# Patient Record
Sex: FEMALE | Race: WHITE | Employment: FULL TIME | URBAN - METROPOLITAN AREA
[De-identification: names, ages, dates, MRNs, and addresses within clinical notes are randomized per-mention and may not be internally consistent; named-entity substitution may affect disease eponyms.]

---

## 2017-02-14 ENCOUNTER — OFFICE VISIT (OUTPATIENT)
Dept: URGENT CARE | Facility: URGENT CARE | Age: 27
End: 2017-02-14
Payer: COMMERCIAL

## 2017-02-14 VITALS
TEMPERATURE: 98 F | BODY MASS INDEX: 23.98 KG/M2 | SYSTOLIC BLOOD PRESSURE: 110 MMHG | HEART RATE: 66 BPM | WEIGHT: 155.4 LBS | OXYGEN SATURATION: 98 % | DIASTOLIC BLOOD PRESSURE: 67 MMHG

## 2017-02-14 DIAGNOSIS — T19.2XXA FOREIGN BODY IN VAGINA, INITIAL ENCOUNTER: Primary | ICD-10-CM

## 2017-02-14 PROCEDURE — 99213 OFFICE O/P EST LOW 20 MIN: CPT | Performed by: NURSE PRACTITIONER

## 2017-02-14 NOTE — MR AVS SNAPSHOT
After Visit Summary   2/14/2017    Jeri Sewell    MRN: [id number]           Patient Information     Date Of Birth          1990        Visit Information        Provider Department      2/14/2017 8:25 PM Nydia Dubois NP [facility]        Today's Diagnoses     Foreign body in vagina, initial encounter    -  1       Follow-ups after your visit        Follow-up notes from your care team     Return if symptoms worsen or fail to improve.      Who to contact     If you have questions or need follow up information about today's clinic visit or your schedule please contact [facility] directly at [phone].  Normal or non-critical lab and imaging results will be communicated to you by MyChart, letter or phone within 4 business days after the clinic has received the results. If you do not hear from us within 7 days, please contact the clinic through Ne[employer] Kooz[employer] or phone. If you have a critical or abnormal lab result, we will notify you by phone as soon as possible.  Submit refill requests through [employer] or call your pharmacy and they will forward the refill request to us. Please allow 3 business days for your refill to be completed.          Additional Information About Your Visit        MyChart Information     [employer] gives you secure access to your electronic health record. If you see a primary care provider, you can also send messages to your care team and make appointments. If you have questions, please call your primary care clinic.  If you do not have a primary care provider, please call [phone] and they will assist you.        Care EveryWhere ID     This is your Care EveryWhere ID. This could be used by other organizations to access your [facility] medical records  [id number]        Your Vitals Were     Pulse Temperature Pulse Oximetry BMI (Body Mass Index)          66 98  F (36.7  C) (Oral) 98% 23.98 kg/m2         Blood Pressure from Last 3  Encounters:   02/14/17 110/67   01/29/16 124/74   12/28/15 125/82    Weight from Last 3 Encounters:   02/14/17 155 lb 6.4 oz (70.5 kg)   01/29/16 156 lb 3.2 oz (70.9 kg)   12/28/15 150 lb 3.2 oz (68.1 kg)              Today, you had the following     No orders found for display       Primary Care Provider Office Phone # Fax #    Sonja THOMAS [facility] [phone]       [facility] [address] [facility] [address]        Thank you!     Thank you for choosing [facility]  for your care. Our goal is always to provide you with excellent care. Hearing back from our patients is one way we can continue to improve our services. Please take a few minutes to complete the written survey that you may receive in the mail after your visit with us. Thank you!             Your Updated Medication List - Protect others around you: Learn how to safely use, store and throw away your medicines at www.disposemymeds.org.          This list is accurate as of: 2/14/17  9:06 PM.  Always use your most recent med list.                   Brand Name Dispense Instructions for use    clotrimazole-betamethasone cream    LOTRISONE    45 g    Apply topically 2 times daily       valACYclovir 1000 mg tablet    VALTREX    90 tablet    Take 1 tablet (1,000 mg) by mouth daily

## 2017-02-15 NOTE — PROGRESS NOTES
SUBJECTIVE:                                                    Jeri Sewell is a 27 year old female who presents to clinic today for the following health issues:      Possible tampon stuck in vagina  Attempted to get tampon out but believes turned to side          Problem list and histories reviewed & adjusted, as indicated.  Additional history: as documented    Patient Active Problem List   Diagnosis     Genital herpes     CARDIOVASCULAR SCREENING; LDL GOAL LESS THAN 160     Multiple pigmented nevi     ASCUS favor benign     Other nonspecific finding on examination of urine     Hashimoto's disease     Past Surgical History   Procedure Laterality Date     Hc tooth extraction w/forcep       Colonoscopy with co2 insufflation N/A 12/17/2014     Procedure: COLONOSCOPY WITH CO2 INSUFFLATION;  Surgeon: Duane, William Charles, MD;  Location:  OR       Social History   Substance Use Topics     Smoking status: Never Smoker     Smokeless tobacco: Never Used      Comment: nonsmoking household     Alcohol use Yes      Comment: occassionally     Family History   Problem Relation Age of Onset     HEART DISEASE Maternal Grandfather          Current Outpatient Prescriptions   Medication Sig Dispense Refill     valACYclovir (VALTREX) 1000 mg tablet Take 1 tablet (1,000 mg) by mouth daily 90 tablet 0     clotrimazole-betamethasone (LOTRISONE) cream Apply topically 2 times daily (Patient not taking: Reported on 2/14/2017) 45 g 0     No Known Allergies    ROS:  C: NEGATIVE for fever, chills, change in weight  E/M: NEGATIVE for ear, mouth and throat problems  R: NEGATIVE for significant cough or SOB  CV: NEGATIVE for chest pain, palpitations or peripheral edema  : positive for foreign body    OBJECTIVE:                                                    /67 (BP Location: Left arm, Patient Position: Chair, Cuff Size: Adult Regular)  Pulse 66  Temp 98  F (36.7  C) (Oral)  Wt 155 lb 6.4 oz (70.5 kg)  SpO2 98%  BMI 23.98  kg/m2  Body mass index is 23.98 kg/(m^2).  GENERAL: healthy, alert and no distress  NECK: no adenopathy, no asymmetry, masses, or scars and thyroid normal to palpation  RESP: lungs clear to auscultation - no rales, rhonchi or wheezes  CV: regular rate and rhythm, normal S1 S2, no S3 or S4, no murmur, click or rub, no peripheral edema and peripheral pulses strong  ABDOMEN: soft, nontender, no hepatosplenomegaly, no masses and bowel sounds normal   (female): normal female external genitalia, normal urethral meatus, vaginal mucosa, no discharge,  palpable tampon inside vagina  MS: no gross musculoskeletal defects noted, no edema    Diagnostic Test Results:  none      ASSESSMENT/PLAN:                                                        ICD-10-CM    1. Foreign body in vagina, initial encounter T19.2XXA      A tampon removed manually from the vagina. Patient tolerated procedure.  Nydia Dubois NP  [facility]

## 2017-02-15 NOTE — NURSING NOTE
"Chief Complaint   Patient presents with     Foreign Body in Vagina     tampon       Initial /67 (BP Location: Left arm, Patient Position: Chair, Cuff Size: Adult Regular)  Pulse 66  Temp 98  F (36.7  C) (Oral)  Wt 155 lb 6.4 oz (70.5 kg)  SpO2 98%  BMI 23.98 kg/m2 Estimated body mass index is 23.98 kg/(m^2) as calculated from the following:    Height as of 1/29/16: 5' 7.5\" (1.715 m).    Weight as of this encounter: 155 lb 6.4 oz (70.5 kg).  Medication Reconciliation: complete     Christina Dong MA    "

## 2017-03-06 DIAGNOSIS — B00.9 HERPES SIMPLEX VIRUS INFECTION: Primary | ICD-10-CM

## 2017-03-06 NOTE — LETTER
[facility]  [address] [address] [phone]      March 7, 2017      Jeri Sewell  [address] [facility] [address]              Dear Jeri,    Regarding a recent refill request we received- one refill was sent to the pharmacy.  You are overdue to be seen in clinic for your annual exam.  Please contact our office to schedule this appointment before your next refill is due.      Sincerely,      Sonja Vail CNP

## 2017-03-07 RX ORDER — VALACYCLOVIR HYDROCHLORIDE 1 G/1
1000 TABLET, FILM COATED ORAL DAILY
Qty: 30 TABLET | Refills: 0 | Status: SHIPPED | OUTPATIENT
Start: 2017-03-07 | End: 2017-04-03

## 2017-03-07 NOTE — TELEPHONE ENCOUNTER
Does not look like patient was informed she needed to be seen for additional refills. Will send 30 day extension. Please notify patient she needs to be seen for further refills. Thank you. Sonja GUZMAN CNP

## 2017-03-07 NOTE — TELEPHONE ENCOUNTER
Last AFE with Sonja Vail on 8/21/15- patient overdue for AFE.  Last prescribed by Sonja on 11/16/16 for 3 months.  Will forward to provider to advise.

## 2017-04-03 ENCOUNTER — OFFICE VISIT (OUTPATIENT)
Dept: OBGYN | Facility: CLINIC | Age: 27
End: 2017-04-03
Payer: COMMERCIAL

## 2017-04-03 VITALS
HEART RATE: 118 BPM | DIASTOLIC BLOOD PRESSURE: 84 MMHG | TEMPERATURE: 99 F | BODY MASS INDEX: 23.4 KG/M2 | HEIGHT: 68 IN | SYSTOLIC BLOOD PRESSURE: 129 MMHG | WEIGHT: 154.4 LBS

## 2017-04-03 DIAGNOSIS — Z01.419 ENCOUNTER FOR GYNECOLOGICAL EXAMINATION WITHOUT ABNORMAL FINDING: Primary | ICD-10-CM

## 2017-04-03 DIAGNOSIS — Z13.6 CARDIOVASCULAR SCREENING; LDL GOAL LESS THAN 160: ICD-10-CM

## 2017-04-03 DIAGNOSIS — B00.9 HERPES SIMPLEX VIRUS INFECTION: ICD-10-CM

## 2017-04-03 DIAGNOSIS — E06.3 HASHIMOTO'S DISEASE: ICD-10-CM

## 2017-04-03 DIAGNOSIS — Z87.42 HISTORY OF ABNORMAL CERVICAL PAP SMEAR: ICD-10-CM

## 2017-04-03 PROCEDURE — 88175 CYTOPATH C/V AUTO FLUID REDO: CPT | Performed by: NURSE PRACTITIONER

## 2017-04-03 PROCEDURE — 87624 HPV HI-RISK TYP POOLED RSLT: CPT | Performed by: NURSE PRACTITIONER

## 2017-04-03 PROCEDURE — 99395 PREV VISIT EST AGE 18-39: CPT | Performed by: NURSE PRACTITIONER

## 2017-04-03 RX ORDER — VALACYCLOVIR HYDROCHLORIDE 1 G/1
1000 TABLET, FILM COATED ORAL DAILY
Qty: 90 TABLET | Refills: 3 | Status: SHIPPED | OUTPATIENT
Start: 2017-04-03 | End: 2018-04-25

## 2017-04-03 ASSESSMENT — PAIN SCALES - GENERAL: PAINLEVEL: NO PAIN (0)

## 2017-04-03 NOTE — PROGRESS NOTES
S: Pt is a 27 year old  0 para 0 who presents today for an annual female exam. LMP: 3/6/17. Contraception: condom.   Declines STD screening. Last Pap smear:  and was ASCUS. Immunizations up to date. Dental Exams: regular. Diet and calcium reviewed. Exercise: regular.    Needs refill on her Valtrex-uses daily for suppression.     Past Medical History:   Diagnosis Date     ASCUS favor benign 2014    Neg HPV - cotest in 3 years     Hashimoto's disease      Herpes      UTI (urinary tract infection)      Past Surgical History:   Procedure Laterality Date     COLONOSCOPY WITH CO2 INSUFFLATION N/A 2014    Procedure: COLONOSCOPY WITH CO2 INSUFFLATION;  Surgeon: Duane, William Charles, MD;  Location: [facility] OR     HC TOOTH EXTRACTION W/FORCEP       Social History   Substance Use Topics     Smoking status: Never Smoker     Smokeless tobacco: Never Used      Comment: nonsmoking household     Alcohol use Yes      Comment: occassionally         REVIEW OF SYSTEMS:  CONSTITUTIONAL:NEGATIVE for fever, chills, change in weight  EYES: NEGATIVE for vision changes or irritation  ENT/MOUTH: NEGATIVE for ear, mouth and throat problems  RESP:NEGATIVE for significant cough or SOB  CV: NEGATIVE for chest pain, palpitations or peripheral edema  GI: NEGATIVE for nausea, abdominal pain, heartburn, or change in bowel habits  Periods are regular q 28-30 days, Cyclic symptoms include none. No intermenstrual bleeding.  MUSCULOSKELATAL:NEGATIVE for significant arthralgias or myalgia  INTEGUMENTARY/SKIN: NEGATIVE for worrisome rashes, moles or lesions  NEURO: NEGATIVE for weakness, dizziness or paresthesias  ENDOCRINE: NEGATIVE for temperature intolerance, skin/hair changes  HEME/ALLERGY/IMMUNE: NEGATIVE for bleeding problems  PSYCHIATRIC: NEGATIVE for changes in mood or affect      OBJECTIVE: This is a well appearing female in no acute distress. Answers questions and maintains eye contact appropriately. Vital signs noted.      EXAM:  EYES: Eyes grossly normal to inspection, PERRL and conjunctivae and sclerae normal  HENT: ear canals and TM's normal and nose and mouth without ulcers or lesions  NECK: no adenopathy, no asymmetry, masses, or scars and thyroid normal to palpation  RESP: lungs clear to auscultation - no rales, rhonchi or wheezes  CV: regular rates and rhythm, normal S1 S2, no S3 or S4 and no murmur, click or rub  LYMPH: normal ant/post cervical and supraclavicular nodes  ABD/GI: soft, nontender, without hepatosplenomegaly or masses  MS: extremities normal- no gross deformities noted  SKIN: no suspicious lesions or rashes  NEURO: Normal strength and tone, mentation intact and speech normal  PSYCH: mentation appears normal and affect normal/bright  Breast exam: Breasts are symmetrical without masses, lymphadenopathy, retraction, dimpling, or nipple discharge bilaterally.  Pelvic Exam: External genitalia without visible lesions or discharge. Normal BUS. Vaginal mucosa pink, rugated, moist, without lesions or discharge. Cervix is pink, nulliparous, midline, without cervical motion tenderness. Pap smear is obtained. Uterus normal size and shape without tenderness or masses. Adnexa without masses or tenderness bilaterally.    A/P:  1) Normal annual female exam. Health maintenance updated. Regular physical activity and healthy diet encouraged. Continue regular dental exams. Encouraged adequate calcium intake. Will schedule lab only appointment for lipid panel.   2) HSV. Refill Valtrex to take once daily x 1 year.  3) Hashimoto's. Last TSH was in 2015, patient prefers to do at same time as fasting labs. Orders entered.    Sonja GUZMAN CNP

## 2017-04-03 NOTE — MR AVS SNAPSHOT
After Visit Summary   4/3/2017    Jeri Sewell    MRN: [id number]           Patient Information     Date Of Birth          1990        Visit Information        Provider Department      4/3/2017 12:10 PM Sonja Vail APRN CNP [facility]        Today's Diagnoses     Encounter for gynecological examination without abnormal finding    -  1    Herpes simplex virus infection        CARDIOVASCULAR SCREENING; LDL GOAL LESS THAN 160        Hashimoto's disease           Follow-ups after your visit        Future tests that were ordered for you today     Open Future Orders        Priority Expected Expires Ordered    TSH with free T4 reflex Routine  4/3/2018 4/3/2017    Lipid panel reflex to direct LDL Routine  4/3/2018 4/3/2017            Who to contact     If you have questions or need follow up information about today's clinic visit or your schedule please contact [facility] directly at [phone].  Normal or non-critical lab and imaging results will be communicated to you by MyChart, letter or phone within 4 business days after the clinic has received the results. If you do not hear from us within 7 days, please contact the clinic through [employer] or phone. If you have a critical or abnormal lab result, we will notify you by phone as soon as possible.  Submit refill requests through [employer] or call your pharmacy and they will forward the refill request to us. Please allow 3 business days for your refill to be completed.          Additional Information About Your Visit        MyChart Information     [employer] gives you secure access to your electronic health record. If you see a primary care provider, you can also send messages to your care team and make appointments. If you have questions, please call your primary care clinic.  If you do not have a primary care provider, please call [phone] and they will assist you.        Care EveryWhere ID     This is your  "Care EveryWhere ID. This could be used by other organizations to access your [facility] medical records  [id number]        Your Vitals Were     Pulse Temperature Height Last Period BMI (Body Mass Index)       118 99  F (37.2  C) (Oral) 5' 8\" (1.727 m) 03/06/2017 (Approximate) 23.48 kg/m2        Blood Pressure from Last 3 Encounters:   04/03/17 129/84   02/14/17 110/67   01/29/16 124/74    Weight from Last 3 Encounters:   04/03/17 154 lb 6.4 oz (70 kg)   02/14/17 155 lb 6.4 oz (70.5 kg)   01/29/16 156 lb 3.2 oz (70.9 kg)              We Performed the Following     Pap imaged thin layer screen reflex to HPV if ASCUS - recommend age 25 - 29          Today's Medication Changes          These changes are accurate as of: 4/3/17 12:20 PM.  If you have any questions, ask your nurse or doctor.               These medicines have changed or have updated prescriptions.        Dose/Directions    valACYclovir 1000 mg tablet   Commonly known as:  VALTREX   This may have changed:  additional instructions   Used for:  Herpes simplex virus infection   Changed by:  Sonja Vail APRN CNP        Dose:  1000 mg   Take 1 tablet (1,000 mg) by mouth daily   Quantity:  90 tablet   Refills:  3            Where to get your medicines      These medications were sent to [place] [address] [place] [facility] [id number] [place] [address]  [id number] [place] [address]     Phone:  [phone]     valACYclovir 1000 mg tablet                Primary Care Provider Office Phone # Fax #    THOMAS Arana [phone]       [facility] [address] [facility] [address]        Thank you!     Thank you for choosing [facility]  for your care. Our goal is always to provide you with excellent care. Hearing back from our patients is one way we can continue to improve our services. Please take a few minutes to complete the written survey that you may receive in the mail after your visit " with us. Thank you!             Your Updated Medication List - Protect others around you: Learn how to safely use, store and throw away your medicines at www.disposemymeds.org.          This list is accurate as of: 4/3/17 12:20 PM.  Always use your most recent med list.                   Brand Name Dispense Instructions for use    clotrimazole-betamethasone cream    LOTRISONE    45 g    Apply topically 2 times daily       valACYclovir 1000 mg tablet    VALTREX    90 tablet    Take 1 tablet (1,000 mg) by mouth daily

## 2017-04-03 NOTE — NURSING NOTE
"Chief Complaint   Patient presents with     Physical       Initial /84  Pulse 118  Temp 99  F (37.2  C) (Oral)  Ht 5' 8\" (1.727 m)  Wt 154 lb 6.4 oz (70 kg)  LMP 03/06/2017 (Approximate)  BMI 23.48 kg/m2 Estimated body mass index is 23.48 kg/(m^2) as calculated from the following:    Height as of this encounter: 5' 8\" (1.727 m).    Weight as of this encounter: 154 lb 6.4 oz (70 kg)..  BP completed using cuff size: regular    Last pap : 05/08/2014 ASC-US      Jada Lebron CMA      "

## 2017-04-05 LAB
COPATH REPORT: NORMAL
PAP: NORMAL

## 2017-04-11 LAB
FINAL DIAGNOSIS: NORMAL
HPV HR 12 DNA CVX QL NAA+PROBE: NEGATIVE
HPV16 DNA SPEC QL NAA+PROBE: NEGATIVE
HPV18 DNA SPEC QL NAA+PROBE: NEGATIVE
SPECIMEN DESCRIPTION: NORMAL

## 2017-10-02 ENCOUNTER — MYC MEDICAL ADVICE (OUTPATIENT)
Dept: OBGYN | Facility: CLINIC | Age: 27
End: 2017-10-02

## 2018-04-09 ENCOUNTER — RADIANT APPOINTMENT (OUTPATIENT)
Dept: GENERAL RADIOLOGY | Facility: CLINIC | Age: 28
End: 2018-04-09
Attending: FAMILY MEDICINE
Payer: COMMERCIAL

## 2018-04-09 ENCOUNTER — OFFICE VISIT (OUTPATIENT)
Dept: ORTHOPEDICS | Facility: CLINIC | Age: 28
End: 2018-04-09
Payer: COMMERCIAL

## 2018-04-09 VITALS
HEART RATE: 64 BPM | SYSTOLIC BLOOD PRESSURE: 114 MMHG | DIASTOLIC BLOOD PRESSURE: 66 MMHG | BODY MASS INDEX: 23.34 KG/M2 | OXYGEN SATURATION: 100 % | HEIGHT: 68 IN | WEIGHT: 154 LBS

## 2018-04-09 DIAGNOSIS — M25.561 ACUTE PAIN OF RIGHT KNEE: Primary | ICD-10-CM

## 2018-04-09 DIAGNOSIS — M25.561 RIGHT KNEE PAIN: ICD-10-CM

## 2018-04-09 PROCEDURE — 99213 OFFICE O/P EST LOW 20 MIN: CPT | Performed by: FAMILY MEDICINE

## 2018-04-09 PROCEDURE — 73562 X-RAY EXAM OF KNEE 3: CPT | Mod: RT | Performed by: RADIOLOGY

## 2018-04-09 ASSESSMENT — PAIN SCALES - GENERAL: PAINLEVEL: MODERATE PAIN (4)

## 2018-04-09 NOTE — NURSING NOTE
"Jeriorestes Sewell's goals for this visit include: evaluate right knee pain   She requests these members of her care team be copied on today's visit information: yes    PCP: Sonja Vail    Referring Provider:  No referring provider defined for this encounter.    Chief Complaint   Patient presents with     Consult     buckled right knee. pt states it twisted and popped and she was down on the ground. pt states she has swelling.04/06/18       Initial /66  Pulse 64  Ht 1.727 m (5' 8\")  Wt 69.9 kg (154 lb)  SpO2 100%  BMI 23.42 kg/m2 Estimated body mass index is 23.42 kg/(m^2) as calculated from the following:    Height as of this encounter: 1.727 m (5' 8\").    Weight as of this encounter: 69.9 kg (154 lb).  Medication Reconciliation: complete    "

## 2018-04-09 NOTE — PATIENT INSTRUCTIONS
Thanks for coming today.  Ortho/[facility] Medicine Clinic  [address] [place] [address]    To schedule future appointments in Ortho Clinic, you may call [phone].    To schedule ordered imaging by your provider:   Call Central Imaging [phone]    To schedule an injection ordered by your provider:  Call Central Imaging Injection scheduling line: [phone]  [email or web address] available online at:  [email or web address]    Please call if any further questions or concerns ([phone]).  Clinic hours 8 am to 5 pm.    Return to clinic (call) if symptoms worsen or fail to improve.

## 2018-04-09 NOTE — LETTER
4/9/2018         RE: Jeri Sewell  [address] [facility] [facility] [address]        Dear Colleague,    Thank you for referring your patient, Jeri Sewell, to the [facility]. Please see a copy of my visit note below.    CHIEF COMPLAINT:  No chief complaint on file.       HISTORY OF PRESENT ILLNESS  Ms. Sewell is a pleasant 28 year old year old female who presents to clinic today with a right knee injury.     Kaylyn was in [place] this weekend for a karate competition when she felt her knee buckle.  Her knee altagracia on rare occasion, this was not new for her.  Minutes later, she felt a another buckling events after twisting her knee.  This was during a valgus moment.  She also felt a pop.  This brought her to the ground.  She is unable to finish the competition.  She was brought off by the medics and treated after her knee started to swell.  She flew home and noticed slightly worse swelling on the way home.  She has been trying Advil and ice.  Currently, her knee feels achy.  Her main complaint is range of motion.      Additional history: as documented    MEDICAL HISTORY  Patient Active Problem List   Diagnosis     Genital herpes     CARDIOVASCULAR SCREENING; LDL GOAL LESS THAN 160     Multiple pigmented nevi     ASCUS of cervix with negative high risk HPV     Other nonspecific finding on examination of urine     Hashimoto's disease       Current Outpatient Prescriptions   Medication Sig Dispense Refill     valACYclovir (VALTREX) 1000 mg tablet Take 1 tablet (1,000 mg) by mouth daily 90 tablet 3     clotrimazole-betamethasone (LOTRISONE) cream Apply topically 2 times daily (Patient not taking: Reported on 2/14/2017) 45 g 0       No Known Allergies    Family History   Problem Relation Age of Onset     HEART DISEASE Maternal Grandfather        Additional medical/Social/Surgical histories reviewed in [facility] and updated as appropriate.     REVIEW OF SYSTEMS  "(4/9/2018)  CONSTITUTIONAL: Denies fever and weight loss  EYES: Denies acute vision changes  ENT: Denies hearing changes or difficulty swallowing  CARDIAC: Denies chest pain or edema  RESPIRATORY: Denies dyspnea, cough or wheeze  GASTROINTESTINAL: Denies abdominal pain, nausea, vomiting  MUSCULOSKELETAL: See HPI  SKIN: Denies any recent rash or lesion  NEUROLOGICAL: Denies numbness or focal weakness  PSYCHIATRIC: No history of psychiatric symptoms or problems  ENDOCRINE: Denies current diagnosis of diabetes  HEMATOLOGY: Denies episodes of easy bleeding      PHYSICAL EXAM    Vitals:    04/09/18 0758   BP: 114/66   Pulse: 64   SpO2: 100%   Weight: 69.9 kg (154 lb)   Height: 1.727 m (5' 8\")     General  - normal appearance, in no obvious distress  CV  - normal popliteal pulse  Pulm  - normal respiratory pattern, non-labored  Musculoskeletal - right knee  - stance: normal gait without limp, no obvious leg length discrepancy  - inspection: 1+ effusion, no ecchymosis, normal muscle tone, normal bone and joint alignment, no obvious deformity  - palpation: no joint line tenderness, patella and patellar tendon non-tender, normal popliteal pulse  - ROM: 120 degrees flexion,  Lacks 5 degrees extension, pain at terminal flexion and extension passively  - strength: 5/5 in flexion, 5/5 in extension  - neuro: no sensory or motor deficit  - special tests:  (-) Lachman although not confident given effusion  (-) Boby  (-) varus at 0 and 30 degrees flexion although painful  (-) valgus at 0 and 30 degrees flexion  Neuro  - no sensory or motor deficit, grossly normal coordination, normal muscle tone  Skin  - no ecchymosis, erythema, warmth, or induration, no obvious rash  Psych  - interactive, appropriate, normal mood and affect           ASSESSMENT & PLAN  Ms. Sewell is a 28 year old year old female who presents to clinic today with a right knee injury.    I ordered and reviewed an x-ray of her knee which shows no acute " issues.    Kaylyn may have an internal derangement of the knee, the etiology is not clear.  After long discussion, I do think it is reasonable to watchfully wait for improvement over the course of the next few days.  I did give her a knee brace with medial and lateral support to wear at all times when weight-bearing.  She is also going to continue icing and using anti-inflammatories.    She has testing for her fourth degree  this weekend, she is going to try to do this, if physically able.  We did discuss that there is some risk involved in this.    Kaylyn is going to let me know how she is doing on Monday.  If better I will likely recommend physical therapy.  If worse I would likely recommend an MRI.    It was a pleasure seeing Kaylyn today.    Cristo Fisher DO, [facility]  Primary Care Sports Medicine      Again, thank you for allowing me to participate in the care of your patient.        Sincerely,        Cristo Fisher DO

## 2018-04-09 NOTE — PROGRESS NOTES
CHIEF COMPLAINT:  No chief complaint on file.       HISTORY OF PRESENT ILLNESS  Ms. Sewell is a pleasant 28 year old year old female who presents to clinic today with a right knee injury.     Kaylyn was in [place] this weekend for a karate competition when she felt her knee buckle.  Her knee altagracia on rare occasion, this was not new for her.  Minutes later, she felt a another buckling events after twisting her knee.  This was during a valgus moment.  She also felt a pop.  This brought her to the ground.  She is unable to finish the competition.  She was brought off by the medics and treated after her knee started to swell.  She flew home and noticed slightly worse swelling on the way home.  She has been trying Advil and ice.  Currently, her knee feels achy.  Her main complaint is range of motion.      Additional history: as documented    MEDICAL HISTORY  Patient Active Problem List   Diagnosis     Genital herpes     CARDIOVASCULAR SCREENING; LDL GOAL LESS THAN 160     Multiple pigmented nevi     ASCUS of cervix with negative high risk HPV     Other nonspecific finding on examination of urine     Hashimoto's disease       Current Outpatient Prescriptions   Medication Sig Dispense Refill     valACYclovir (VALTREX) 1000 mg tablet Take 1 tablet (1,000 mg) by mouth daily 90 tablet 3     clotrimazole-betamethasone (LOTRISONE) cream Apply topically 2 times daily (Patient not taking: Reported on 2/14/2017) 45 g 0       No Known Allergies    Family History   Problem Relation Age of Onset     HEART DISEASE Maternal Grandfather        Additional medical/Social/Surgical histories reviewed in [facility] and updated as appropriate.     REVIEW OF SYSTEMS (4/9/2018)  CONSTITUTIONAL: Denies fever and weight loss  EYES: Denies acute vision changes  ENT: Denies hearing changes or difficulty swallowing  CARDIAC: Denies chest pain or edema  RESPIRATORY: Denies dyspnea, cough or wheeze  GASTROINTESTINAL: Denies abdominal pain, nausea,  "vomiting  MUSCULOSKELETAL: See HPI  SKIN: Denies any recent rash or lesion  NEUROLOGICAL: Denies numbness or focal weakness  PSYCHIATRIC: No history of psychiatric symptoms or problems  ENDOCRINE: Denies current diagnosis of diabetes  HEMATOLOGY: Denies episodes of easy bleeding      PHYSICAL EXAM    Vitals:    04/09/18 0758   BP: 114/66   Pulse: 64   SpO2: 100%   Weight: 69.9 kg (154 lb)   Height: 1.727 m (5' 8\")     General  - normal appearance, in no obvious distress  CV  - normal popliteal pulse  Pulm  - normal respiratory pattern, non-labored  Musculoskeletal - right knee  - stance: normal gait without limp, no obvious leg length discrepancy  - inspection: 1+ effusion, no ecchymosis, normal muscle tone, normal bone and joint alignment, no obvious deformity  - palpation: no joint line tenderness, patella and patellar tendon non-tender, normal popliteal pulse  - ROM: 120 degrees flexion,  Lacks 5 degrees extension, pain at terminal flexion and extension passively  - strength: 5/5 in flexion, 5/5 in extension  - neuro: no sensory or motor deficit  - special tests:  (-) Lachman although not confident given effusion  (-) Boby  (-) varus at 0 and 30 degrees flexion although painful  (-) valgus at 0 and 30 degrees flexion  Neuro  - no sensory or motor deficit, grossly normal coordination, normal muscle tone  Skin  - no ecchymosis, erythema, warmth, or induration, no obvious rash  Psych  - interactive, appropriate, normal mood and affect           ASSESSMENT & PLAN  Ms. Sewell is a 28 year old year old female who presents to clinic today with a right knee injury.    I ordered and reviewed an x-ray of her knee which shows no acute issues.    Kaylyn may have an internal derangement of the knee, the etiology is not clear.  After long discussion, I do think it is reasonable to watchfully wait for improvement over the course of the next few days.  I did give her a knee brace with medial and lateral support to wear at " all times when weight-bearing.  She is also going to continue icing and using anti-inflammatories.    She has testing for her fourth degree  this weekend, she is going to try to do this, if physically able.  We did discuss that there is some risk involved in this.    Kaylyn is going to let me know how she is doing on Monday.  If better I will likely recommend physical therapy.  If worse I would likely recommend an MRI.    It was a pleasure seeing Kaylyn today.    Cristo Fisher DO, CAQSM  Primary Care Sports Medicine

## 2018-04-09 NOTE — MR AVS SNAPSHOT
After Visit Summary   2018    Jeri Sewell    MRN: [id number]           Patient Information     Date Of Birth          1990        Visit Information        Provider Department      2018 8:00 AM Cristo Fisher, DO [facility]        Today's Diagnoses     Acute pain of right knee    -  1      Care Instructions    Thanks for coming today.  Ortho/Sports Medicine Clinic  [address] [place] [address]    To schedule future appointments in Ortho Clinic, you may call [phone].    To schedule ordered imaging by your provider:   Call Central Imaging [phone]    To schedule an injection ordered by your provider:  Call Central Imaging Injection scheduling line: [phone]  [employer] available online at:  [employer]/Ayla    Please call if any further questions or concerns ([phone]).  Clinic hours 8 am to 5 pm.    Return to clinic (call) if symptoms worsen or fail to improve.            Follow-ups after your visit        Who to contact     If you have questions or need follow up information about today's clinic visit or your schedule please contact [facility] directly at [phone].  Normal or non-critical lab and imaging results will be communicated to you by RentJuicehart, letter or phone within 4 business days after the clinic has received the results. If you do not hear from us within 7 days, please contact the clinic through RentJuicehart or phone. If you have a critical or abnormal lab result, we will notify you by phone as soon as possible.  Submit refill requests through [employer] or call your pharmacy and they will forward the refill request to us. Please allow 3 business days for your refill to be completed.          Additional Information About Your Visit        MyChart Information     [employer] gives you secure access to your electronic health record. If you see a primary care provider, you can also send messages to  "your care team and make appointments. If you have questions, please call your primary care clinic.  If you do not have a primary care provider, please call [phone] and they will assist you.      T[employer] is an electronic gateway that provides easy, online access to your medical records. With Tookitaki, you can request a clinic appointment, read your test results, renew a prescription or communicate with your care team.     To access your existing account, please contact your [facility] or call [phone] for assistance.        Care EveryWhere ID     This is your Care EveryWhere ID. This could be used by other organizations to access your [place] medical records  [id number]        Your Vitals Were     Pulse Height Pulse Oximetry BMI (Body Mass Index)          64 1.727 m (5' 8\") 100% 23.42 kg/m2         Blood Pressure from Last 3 Encounters:   04/09/18 114/66   04/03/17 129/84   02/14/17 110/67    Weight from Last 3 Encounters:   04/09/18 69.9 kg (154 lb)   04/03/17 70 kg (154 lb 6.4 oz)   02/14/17 70.5 kg (155 lb 6.4 oz)               Primary Care Provider Office Phone # Fax #    Sonja THOMAS [facility] [phone] [address] [facility] [address]        Equal Access to Services     NASEEM BENNETT : Hadii aad ku hadasho Soomaali, waaxda luqadaha, qaybta kaalmada adeegyada, chad mak. So [facility] [phone].    ATENCIÓN: Si habla español, tiene a ann disposición servicios gratuitos de asistencia lingüística. Llame al [phone].    We comply with applicable federal civil rights laws and [place] laws. We do not discriminate on the basis of race, color, national origin, age, disability, sex, sexual orientation, or gender identity.            Thank you!     Thank you for choosing [facility]  for your care. Our goal is always to provide you with excellent care. Hearing back from our patients is one way we " can continue to improve our services. Please take a few minutes to complete the written survey that you may receive in the mail after your visit with us. Thank you!             Your Updated Medication List - Protect others around you: Learn how to safely use, store and throw away your medicines at www.disposemymeds.org.          This list is accurate as of 4/9/18  8:42 AM.  Always use your most recent med list.                   Brand Name Dispense Instructions for use Diagnosis    clotrimazole-betamethasone cream    LOTRISONE    45 g    Apply topically 2 times daily    Vulvar pruritus       valACYclovir 1000 mg tablet    VALTREX    90 tablet    Take 1 tablet (1,000 mg) by mouth daily    Herpes simplex virus infection

## 2018-04-17 DIAGNOSIS — M25.561 ACUTE PAIN OF RIGHT KNEE: Primary | ICD-10-CM

## 2018-04-19 ENCOUNTER — RADIANT APPOINTMENT (OUTPATIENT)
Dept: MRI IMAGING | Facility: CLINIC | Age: 28
End: 2018-04-19
Attending: FAMILY MEDICINE
Payer: COMMERCIAL

## 2018-04-19 DIAGNOSIS — M25.561 ACUTE PAIN OF RIGHT KNEE: ICD-10-CM

## 2018-04-19 PROCEDURE — 73721 MRI JNT OF LWR EXTRE W/O DYE: CPT | Mod: TC

## 2018-04-20 ENCOUNTER — MYC MEDICAL ADVICE (OUTPATIENT)
Dept: ORTHOPEDICS | Facility: CLINIC | Age: 28
End: 2018-04-20

## 2018-04-25 DIAGNOSIS — B00.9 HERPES SIMPLEX VIRUS INFECTION: ICD-10-CM

## 2018-04-25 RX ORDER — VALACYCLOVIR HYDROCHLORIDE 1 G/1
1000 TABLET, FILM COATED ORAL DAILY
Qty: 30 TABLET | Refills: 0 | Status: SHIPPED | OUTPATIENT
Start: 2018-04-25 | End: 2018-05-14

## 2018-04-25 NOTE — TELEPHONE ENCOUNTER
Sallie Vang contacted [facility] on 04/25/18 and left a message. If patient calls back please schedule appointment as soon as possible, refill of 30 of Valtrex was sent to the pharmacy.

## 2018-04-25 NOTE — TELEPHONE ENCOUNTER
"Requested Prescriptions   Pending Prescriptions Disp Refills     valACYclovir (VALTREX) 1000 mg tablet [Pharmacy Med Name: VALACYCLOVIR HCL 1 GRAM TABLET] 90 tablet 2     Sig: TAKE 1 TABLET (1,000 MG) BY MOUTH DAILY    Antivirals for Herpes Protocol Failed    4/25/2018  8:09 AM       Failed - Recent (12 mo) or future (30 days) visit within the authorizing provider's specialty    Patient had office visit in the last 12 months or has a visit in the next 30 days with authorizing provider or within the authorizing provider's specialty.  See \"Patient Info\" tab in inbasket, or \"Choose Columns\" in Meds & Orders section of the refill encounter.           Failed - Normal serum creatinine on file in past 12 months    Recent Labs   Lab Test  11/12/14   0720   CR  1.01            Passed - Patient is age 12 or older        "

## 2018-05-21 ENCOUNTER — OFFICE VISIT (OUTPATIENT)
Dept: OBGYN | Facility: CLINIC | Age: 28
End: 2018-05-21
Payer: COMMERCIAL

## 2018-05-21 VITALS
HEART RATE: 58 BPM | BODY MASS INDEX: 22.1 KG/M2 | SYSTOLIC BLOOD PRESSURE: 125 MMHG | OXYGEN SATURATION: 99 % | WEIGHT: 145.8 LBS | DIASTOLIC BLOOD PRESSURE: 65 MMHG | HEIGHT: 68 IN | TEMPERATURE: 97 F

## 2018-05-21 DIAGNOSIS — Z13.6 CARDIOVASCULAR SCREENING; LDL GOAL LESS THAN 160: ICD-10-CM

## 2018-05-21 DIAGNOSIS — B00.9 HERPES SIMPLEX VIRUS INFECTION: ICD-10-CM

## 2018-05-21 DIAGNOSIS — Z01.419 ENCOUNTER FOR GYNECOLOGICAL EXAMINATION WITHOUT ABNORMAL FINDING: Primary | ICD-10-CM

## 2018-05-21 LAB
CHOLEST SERPL-MCNC: 117 MG/DL
HDLC SERPL-MCNC: 57 MG/DL
LDLC SERPL CALC-MCNC: 51 MG/DL
NONHDLC SERPL-MCNC: 60 MG/DL
TRIGL SERPL-MCNC: 43 MG/DL

## 2018-05-21 PROCEDURE — 99395 PREV VISIT EST AGE 18-39: CPT | Performed by: NURSE PRACTITIONER

## 2018-05-21 PROCEDURE — 36415 COLL VENOUS BLD VENIPUNCTURE: CPT | Performed by: NURSE PRACTITIONER

## 2018-05-21 PROCEDURE — 80061 LIPID PANEL: CPT | Performed by: NURSE PRACTITIONER

## 2018-05-21 RX ORDER — VALACYCLOVIR HYDROCHLORIDE 1 G/1
1000 TABLET, FILM COATED ORAL DAILY
Qty: 90 TABLET | Refills: 3 | Status: SHIPPED | OUTPATIENT
Start: 2018-05-21 | End: 2019-05-15

## 2018-05-21 ASSESSMENT — PAIN SCALES - GENERAL: PAINLEVEL: NO PAIN (0)

## 2018-05-21 NOTE — NURSING NOTE
"Chief Complaint   Patient presents with     Physical       Initial /65  Pulse 58  Temp 97  F (36.1  C) (Oral)  Ht 5' 8\" (1.727 m)  Wt 145 lb 12.8 oz (66.1 kg)  LMP 04/30/2018 (Exact Date)  SpO2 99%  BMI 22.17 kg/m2 Estimated body mass index is 22.17 kg/(m^2) as calculated from the following:    Height as of this encounter: 5' 8\" (1.727 m).    Weight as of this encounter: 145 lb 12.8 oz (66.1 kg)..  BP completed using cuff size: genet Lebron CMA    "

## 2018-05-21 NOTE — MR AVS SNAPSHOT
"              After Visit Summary   5/21/2018    Jeri Sewell    MRN: [id number]           Patient Information     Date Of Birth          1990        Visit Information        Provider Department      5/21/2018 8:10 AM Sonja Vail APRN CNP [facility]        Today's Diagnoses     Encounter for gynecological examination without abnormal finding    -  1    CARDIOVASCULAR SCREENING; LDL GOAL LESS THAN 160        Herpes simplex virus infection           Follow-ups after your visit        Who to contact     If you have questions or need follow up information about today's clinic visit or your schedule please contact [facility] directly at [phone].  Normal or non-critical lab and imaging results will be communicated to you by MyChart, letter or phone within 4 business days after the clinic has received the results. If you do not hear from us within 7 days, please contact the clinic through Mapkinhart or phone. If you have a critical or abnormal lab result, we will notify you by phone as soon as possible.  Submit refill requests through [employer] or call your pharmacy and they will forward the refill request to us. Please allow 3 business days for your refill to be completed.          Additional Information About Your Visit        MyChart Information     [employer] gives you secure access to your electronic health record. If you see a primary care provider, you can also send messages to your care team and make appointments. If you have questions, please call your primary care clinic.  If you do not have a primary care provider, please call [phone] and they will assist you.        Care EveryWhere ID     This is your Care EveryWhere ID. This could be used by other organizations to access your [place] medical records  [id number]        Your Vitals Were     Pulse Temperature Height Last Period Pulse Oximetry BMI (Body Mass Index)    58 97  F (36.1  C) (Oral) 5' 8\" (1.727 m) " 04/30/2018 (Exact Date) 99% 22.17 kg/m2       Blood Pressure from Last 3 Encounters:   05/21/18 125/65   04/09/18 114/66   04/03/17 129/84    Weight from Last 3 Encounters:   05/21/18 145 lb 12.8 oz (66.1 kg)   04/09/18 154 lb (69.9 kg)   04/03/17 154 lb 6.4 oz (70 kg)              We Performed the Following     Lipid panel reflex to direct LDL Fasting          Today's Medication Changes          These changes are accurate as of 5/21/18  8:31 AM.  If you have any questions, ask your nurse or doctor.               These medicines have changed or have updated prescriptions.        Dose/Directions    valACYclovir 1000 mg tablet   Commonly known as:  VALTREX   This may have changed:  additional instructions   Used for:  Herpes simplex virus infection   Changed by:  Sonja Vail APRN CNP        Dose:  1000 mg   Take 1 tablet (1,000 mg) by mouth daily   Quantity:  90 tablet   Refills:  3         Stop taking these medicines if you haven't already. Please contact your care team if you have questions.     clotrimazole-betamethasone cream   Commonly known as:  LOTRISONE   Stopped by:  Sonja Vail APRN CNP                Where to get your medicines      These medications were [facility] to [place] [address] [place] [facility] [address] [facility] [address] [facility] [address] [facility] [address]     Phone:  [phone]     valACYclovir 1000 mg tablet                Primary Care Provider Office Phone # Fax #    THOMAS Arana [phone] [address] [facility] [address]        Equal Access to Services     [facility] Memorial HospitalMARY ANNE : Hadii haley ku hadasho Soomaali, waaxda luqadaha, qaybta kaalmada adeegyabecky, chad mak. So [facility] [phone].    ATENCIÓN: Si habla español, tiene a ann disposición servicios gratuitos de asistencia lingüística. Llame al [phone].    We comply with applicable federal civil rights laws and [place] laws. We do not discriminate on the  basis of race, color, national origin, age, disability, sex, sexual orientation, or gender identity.            Thank you!     Thank you for choosing [facility]  [facility] your [facility]. Our goal is always to provide you with excellent care. Hearing back from our patients is one way we can continue to improve our services. Please take a few minutes to complete the written survey that you may receive in the mail after your visit with us. Thank you!             Your Updated Medication List - Protect others around you: Learn how to safely use, store and throw away your medicines at www.disposemymeds.org.          This list is accurate as of 5/21/18  8:31 AM.  Always use your most recent med list.                   Brand Name Dispense Instructions for use Diagnosis    valACYclovir 1000 mg tablet    VALTREX    90 tablet    Take 1 tablet (1,000 mg) by mouth daily    Herpes simplex virus infection

## 2018-05-21 NOTE — PROGRESS NOTES
SUBJECTIVE:   CC: Jeri Sewell is an 28 year old woman who presents for preventive health visit.     Physical   Annual:     Getting at least 3 servings of Calcium per day::  Yes    Bi-annual eye exam::  NO    Dental care twice a year::  Yes    Sleep apnea or symptoms of sleep apnea::  None    Diet::  Gluten-free/reduced and Other    Frequency of exercise::  2-3 days/week    Duration of exercise::  45-60 minutes    Taking medications regularly::  Yes    Medication side effects::  None    Additional concerns today::  No              Patient moved to [place]-recently engaged.   Having surgery on right ACL in July.      Today's PHQ-2 Score:   PHQ-2 ( 1999 [employer]) 5/21/2018   Q1: Little interest or pleasure in doing things 0   Q2: Feeling down, depressed or hopeless 0   PHQ-2 Score 0   Q1: Little interest or pleasure in doing things Not at all   Q2: Feeling down, depressed or hopeless Not at all   PHQ-2 Score 0       Abuse: Current or Past(Physical, Sexual or Emotional)- No  Do you feel safe in your environment - Yes    Social History   Substance Use Topics     Smoking status: Never Smoker     Smokeless tobacco: Never Used      Comment: nonsmoking household     Alcohol use Yes      Comment: occassionally     Alcohol Use 5/21/2018   If you drink alcohol do you typically have greater than 3 drinks per day OR greater than 7 drinks per week? No   No flowsheet data found.    Reviewed orders with patient.  Reviewed health maintenance and updated orders accordingly - Yes  Patient Active Problem List   Diagnosis     Genital herpes     CARDIOVASCULAR SCREENING; LDL GOAL LESS THAN 160     Multiple pigmented nevi     ASCUS of cervix with negative high risk HPV     Other nonspecific finding on examination of urine     Hashimoto's disease     Past Surgical History:   Procedure Laterality Date     COLONOSCOPY WITH CO2 INSUFFLATION N/A 12/17/2014    Procedure: COLONOSCOPY WITH CO2 INSUFFLATION;  Surgeon: Duane, William  MD Genaro;  Location: [facility] OR     HC TOOTH EXTRACTION W/FORCEP         Social History   Substance Use Topics     Smoking status: Never Smoker     Smokeless tobacco: Never Used      Comment: nonsmoking household     Alcohol use Yes      Comment: occassionally     Family History   Problem Relation Age of Onset     HEART DISEASE Maternal Grandfather            Mammogram not appropriate for this patient based on age.    Pertinent mammograms are reviewed under the imaging tab.  History of abnormal Pap smear:   YES - updated in Problem List and Health Maintenance accordingly  Last 3 Pap and HPV Results:   PAP / HPV Latest Ref Rng & Units 4/3/2017 5/8/2014 7/13/2011   PAP - NIL ASC-US(A) NIL   HPV 16 DNA NEG Negative - -   HPV 18 DNA NEG Negative - -   OTHER HR HPV NEG Negative - -       Reviewed and updated as needed this visit by clinical staff  Tobacco  Allergies  Meds  Med Hx  Surg Hx  Fam Hx  Soc Hx        Reviewed and updated as needed this visit by Provider        Past Medical History:   Diagnosis Date     ASCUS of cervix with negative high risk HPV 05/2014    Neg HPV - cotest in 3 years     Hashimoto's disease      Herpes      UTI (urinary tract infection) 1994      Past Surgical History:   Procedure Laterality Date     COLONOSCOPY WITH CO2 INSUFFLATION N/A 12/17/2014    Procedure: COLONOSCOPY WITH CO2 INSUFFLATION;  Surgeon: Duane, William Charles, MD;  Location: [facility] OR      TOOTH EXTRACTION W/FORCEP         Review of Systems  CONSTITUTIONAL: NEGATIVE for fever, chills, change in weight  INTEGUMENTARU/SKIN: NEGATIVE for worrisome rashes, moles or lesions  EYES: NEGATIVE for vision changes or irritation  ENT: NEGATIVE for ear, mouth and throat problems  RESP: NEGATIVE for significant cough or SOB  BREAST: NEGATIVE for masses, tenderness or discharge  CV: NEGATIVE for chest pain, palpitations or peripheral edema  GI: NEGATIVE for nausea, abdominal pain, heartburn, or change in bowel habits  : NEGATIVE for  "unusual urinary or vaginal symptoms. Periods are regular.  MUSCULOSKELETAL: NEGATIVE for significant arthralgias or myalgia  NEURO: NEGATIVE for weakness, dizziness or paresthesias  PSYCHIATRIC: NEGATIVE for changes in mood or affect     OBJECTIVE:   /65  Pulse 58  Temp 97  F (36.1  C) (Oral)  Ht 5' 8\" (1.727 m)  Wt 145 lb 12.8 oz (66.1 kg)  LMP 04/30/2018 (Exact Date)  SpO2 99%  BMI 22.17 kg/m2  Physical Exam  GENERAL: healthy, alert and no distress  EYES: Eyes grossly normal to inspection, PERRL and conjunctivae and sclerae normal  HENT: ear canals and TM's normal, nose and mouth without ulcers or lesions  NECK: no adenopathy, no asymmetry, masses, or scars and thyroid normal to palpation  RESP: lungs clear to auscultation - no rales, rhonchi or wheezes  BREAST: normal without masses, tenderness or nipple discharge and no palpable axillary masses or adenopathy  CV: regular rate and rhythm, normal S1 S2, no S3 or S4, no murmur, click or rub, no peripheral edema and peripheral pulses strong  ABDOMEN: soft, nontender, no hepatosplenomegaly, no masses and bowel sounds normal   (female): normal female external genitalia, normal urethral meatus, vaginal mucosa pink, moist, well rugated, and normal cervix/adnexa/uterus without masses or discharge  MS: no gross musculoskeletal defects noted, no edema  SKIN: no suspicious lesions or rashes  NEURO: Normal strength and tone, mentation intact and speech normal  PSYCH: mentation appears normal, affect normal/bright    ASSESSMENT/PLAN:   1. Encounter for gynecological examination without abnormal finding  Pap smear up to date    2. CARDIOVASCULAR SCREENING; LDL GOAL LESS THAN 160  - Lipid panel reflex to direct LDL Fasting    3. Herpes simplex virus infection  Takes daily for suppressive therapy. No adverse side effects.  - valACYclovir (VALTREX) 1000 mg tablet; Take 1 tablet (1,000 mg) by mouth daily  Dispense: 90 tablet; Refill: 3    COUNSELING:  Reviewed " "preventive health counseling, as reflected in patient instructions  Special attention given to:        Regular exercise       Healthy diet/nutrition       Contraception         reports that she has never smoked. She has never used smokeless tobacco.    Estimated body mass index is 22.17 kg/(m^2) as calculated from the following:    Height as of this encounter: 5' 8\" (1.727 m).    Weight as of this encounter: 145 lb 12.8 oz (66.1 kg).       Counseling Resources:  ATP IV Guidelines  Pooled Cohorts Equation Calculator  Breast Cancer Risk Calculator  FRAX Risk Assessment  ICSI Preventive Guidelines  Dietary Guidelines for Americans, 2010  USDA's MyPlate  ASA Prophylaxis  Lung CA Screening    THOMAS Arana [facility] [place]  Answers for HPI/ROS submitted by the patient on 5/21/2018   PHQ-2 Score: 0    "

## 2018-05-24 NOTE — TELEPHONE ENCOUNTER
Late documentation from 4/26/18:    RUDDY received and images mailed on 4/26/18 to [facility]. All other records sent by copy service.    Nita Couch  HIM Specialist  [facility]

## 2018-05-28 DIAGNOSIS — B00.9 HERPES SIMPLEX VIRUS INFECTION: ICD-10-CM

## 2018-05-29 RX ORDER — VALACYCLOVIR HYDROCHLORIDE 1 G/1
TABLET, FILM COATED ORAL
Qty: 30 TABLET | Refills: 0 | OUTPATIENT
Start: 2018-05-29

## 2018-05-29 NOTE — TELEPHONE ENCOUNTER
valACYclovir (VALTREX) 1000 mg tablet 90 tablet 3 5/21/2018     Sig - Route: Take 1 tablet (1,000 mg) by mouth daily - Oral      Phone call to patient to clarify pharmacy. Patient has moved so needed Rx to be sent to [facility] as noted above. Patient stated she was able to fill her Rx. Patient stated she did not cancel her Rx at [facility] in [facility]. Explained will update pharmacy. Patient appreciative of assistance.   Medication is being denied due to: not needed. Faina Granger RN, BAN

## 2018-06-11 ENCOUNTER — MYC MEDICAL ADVICE (OUTPATIENT)
Dept: OBGYN | Facility: CLINIC | Age: 28
End: 2018-06-11

## 2018-06-11 DIAGNOSIS — N30.00 ACUTE CYSTITIS WITHOUT HEMATURIA: Primary | ICD-10-CM

## 2018-06-11 RX ORDER — NITROFURANTOIN 25; 75 MG/1; MG/1
100 CAPSULE ORAL 2 TIMES DAILY
Qty: 10 CAPSULE | Refills: 0 | Status: SHIPPED | OUTPATIENT
Start: 2018-06-11

## 2018-06-11 NOTE — TELEPHONE ENCOUNTER
Routing to THOMAS Olivas, CNP, Patient requesting other antibiotic. Patient was seen in [place] and treated but now patient still experiencing slight tingling sensation as before. Denies fever, and urgency is improving with pushing fluids.   Would you like to see patient in clinic or be able to treat.  Sallie Vang RN

## 2018-06-12 ENCOUNTER — MYC MEDICAL ADVICE (OUTPATIENT)
Dept: OBGYN | Facility: CLINIC | Age: 28
End: 2018-06-12

## 2018-06-20 ENCOUNTER — DOCUMENTATION ONLY (OUTPATIENT)
Dept: LAB | Facility: CLINIC | Age: 28
End: 2018-06-20

## 2018-06-20 NOTE — PROGRESS NOTES
This patient has overdue labs. A letter was sent on 4/19/2018 and there has been no lab appointment made. If you still want these labs done, please have your care team contact the patient to make a lab appointment. Otherwise, please have the labs discontinued and close the encounter.    Thank you,  [facility]

## 2019-08-14 DIAGNOSIS — B00.9 HERPES SIMPLEX VIRUS INFECTION: ICD-10-CM

## 2019-08-14 RX ORDER — VALACYCLOVIR HYDROCHLORIDE 1 G/1
1000 TABLET, FILM COATED ORAL DAILY
Qty: 90 TABLET | Refills: 0 | OUTPATIENT
Start: 2019-08-14

## 2019-08-14 NOTE — TELEPHONE ENCOUNTER
"Antivirals for Herpes Protocol Failed   valACYclovir (VALTREX) 1000 mg tablet [Pharmacy Med Name: VALACYCLOVIR HCL 1 GRAM TABLET]   Rerun Protocol (8/14/2019 8:44 AM)      Recent (12 mo) or future (30 days) visit within the authorizing provider's specialty      Patient had office visit in the last 12 months or has a visit in the next 30 days with authorizing provider or within the authorizing provider's specialty.  See \"Patient Info\" tab in inbasket, or \"Choose Columns\" in Meds & Orders section of the refill encounter.              Normal serum creatinine on file in past 12 months          Recent Labs   Lab Test 11/12/14  0720   CR 1.01            Patient is age 12 or older         Medication is active on med list        Pt due for annual, no appt scheduled. Pt already received one extension (5/15/19). Rx denied. Mychart message sent to patient.      Last written prescription date:5/15/19        Last fill quantity: 90, # refills:0        Last office visit: 5/21/2018       Future office visit: Not scheduled        Is this a controlled substance?  No    Suzan Crowley RN on 8/14/2019 at 8:58 AM    "

## 2020-02-23 ENCOUNTER — HEALTH MAINTENANCE LETTER (OUTPATIENT)
Age: 30
End: 2020-02-23

## 2020-12-12 ENCOUNTER — HEALTH MAINTENANCE LETTER (OUTPATIENT)
Age: 30
End: 2020-12-12

## 2021-04-11 ENCOUNTER — HEALTH MAINTENANCE LETTER (OUTPATIENT)
Age: 31
End: 2021-04-11

## 2021-09-26 ENCOUNTER — HEALTH MAINTENANCE LETTER (OUTPATIENT)
Age: 31
End: 2021-09-26

## 2022-05-08 ENCOUNTER — HEALTH MAINTENANCE LETTER (OUTPATIENT)
Age: 32
End: 2022-05-08

## 2023-01-08 ENCOUNTER — HEALTH MAINTENANCE LETTER (OUTPATIENT)
Age: 33
End: 2023-01-08

## 2023-06-02 ENCOUNTER — HEALTH MAINTENANCE LETTER (OUTPATIENT)
Age: 33
End: 2023-06-02

## 2023-06-09 NOTE — TELEPHONE ENCOUNTER
30 refill sent to pharmacy. Needs to be seen for further refills. Sonja GUZMAN CNP     Dressing: telfa dressing